# Patient Record
Sex: FEMALE | Race: BLACK OR AFRICAN AMERICAN | NOT HISPANIC OR LATINO | Employment: FULL TIME | URBAN - METROPOLITAN AREA
[De-identification: names, ages, dates, MRNs, and addresses within clinical notes are randomized per-mention and may not be internally consistent; named-entity substitution may affect disease eponyms.]

---

## 2022-08-22 ENCOUNTER — PATIENT MESSAGE (OUTPATIENT)
Dept: SURGERY | Facility: HOSPITAL | Age: 37
End: 2022-08-22
Payer: COMMERCIAL

## 2022-08-31 ENCOUNTER — HOSPITAL ENCOUNTER (OUTPATIENT)
Dept: PREADMISSION TESTING | Facility: HOSPITAL | Age: 37
Discharge: HOME | End: 2022-08-31
Attending: OBSTETRICS & GYNECOLOGY
Payer: COMMERCIAL

## 2022-08-31 VITALS — DIASTOLIC BLOOD PRESSURE: 100 MMHG | SYSTOLIC BLOOD PRESSURE: 150 MMHG

## 2022-08-31 DIAGNOSIS — Z01.818 PREOP EXAMINATION: Primary | ICD-10-CM

## 2022-08-31 DIAGNOSIS — D21.9 FIBROIDS: ICD-10-CM

## 2022-08-31 DIAGNOSIS — N92.0 MENORRHAGIA WITH REGULAR CYCLE: ICD-10-CM

## 2022-08-31 DIAGNOSIS — D64.9 ANEMIA, UNSPECIFIED TYPE: ICD-10-CM

## 2022-08-31 DIAGNOSIS — R03.0 ELEVATED BP WITHOUT DIAGNOSIS OF HYPERTENSION: ICD-10-CM

## 2022-08-31 DIAGNOSIS — E66.09 CLASS 2 OBESITY DUE TO EXCESS CALORIES WITHOUT SERIOUS COMORBIDITY WITH BODY MASS INDEX (BMI) OF 37.0 TO 37.9 IN ADULT: ICD-10-CM

## 2022-08-31 PROBLEM — E66.812 CLASS 2 OBESITY IN ADULT: Status: ACTIVE | Noted: 2022-08-31

## 2022-08-31 PROBLEM — E66.9 CLASS 2 OBESITY IN ADULT: Status: ACTIVE | Noted: 2022-08-31

## 2022-08-31 LAB
ALBUMIN SERPL BCP-MCNC: 3.6 G/DL (ref 3.5–5.2)
ALP SERPL-CCNC: 48 U/L (ref 55–135)
ALT SERPL W/O P-5'-P-CCNC: 21 U/L (ref 10–44)
ANION GAP SERPL CALC-SCNC: 6 MMOL/L (ref 8–16)
AST SERPL-CCNC: 19 U/L (ref 10–40)
BASOPHILS # BLD AUTO: 0.06 K/UL (ref 0–0.2)
BASOPHILS NFR BLD: 0.8 % (ref 0–1.9)
BILIRUB SERPL-MCNC: 0.3 MG/DL (ref 0.1–1)
BUN SERPL-MCNC: 10 MG/DL (ref 6–20)
CALCIUM SERPL-MCNC: 9.3 MG/DL (ref 8.7–10.5)
CHLORIDE SERPL-SCNC: 108 MMOL/L (ref 95–110)
CO2 SERPL-SCNC: 26 MMOL/L (ref 23–29)
CREAT SERPL-MCNC: 0.8 MG/DL (ref 0.5–1.4)
DIFFERENTIAL METHOD: ABNORMAL
EOSINOPHIL # BLD AUTO: 0.1 K/UL (ref 0–0.5)
EOSINOPHIL NFR BLD: 1.1 % (ref 0–8)
ERYTHROCYTE [DISTWIDTH] IN BLOOD BY AUTOMATED COUNT: 20.8 % (ref 11.5–14.5)
EST. GFR  (NO RACE VARIABLE): >60 ML/MIN/1.73 M^2
GLUCOSE SERPL-MCNC: 86 MG/DL (ref 70–110)
HCT VFR BLD AUTO: 28 % (ref 37–48.5)
HGB BLD-MCNC: 8.3 G/DL (ref 12–16)
IMM GRANULOCYTES # BLD AUTO: 0.02 K/UL (ref 0–0.04)
IMM GRANULOCYTES NFR BLD AUTO: 0.3 % (ref 0–0.5)
LYMPHOCYTES # BLD AUTO: 2.8 K/UL (ref 1–4.8)
LYMPHOCYTES NFR BLD: 37.2 % (ref 18–48)
MCH RBC QN AUTO: 20.5 PG (ref 27–31)
MCHC RBC AUTO-ENTMCNC: 29.6 G/DL (ref 32–36)
MCV RBC AUTO: 69 FL (ref 82–98)
MONOCYTES # BLD AUTO: 0.7 K/UL (ref 0.3–1)
MONOCYTES NFR BLD: 9.7 % (ref 4–15)
NEUTROPHILS # BLD AUTO: 3.8 K/UL (ref 1.8–7.7)
NEUTROPHILS NFR BLD: 50.9 % (ref 38–73)
NRBC BLD-RTO: 0 /100 WBC
PLATELET # BLD AUTO: 439 K/UL (ref 150–450)
PMV BLD AUTO: 9.5 FL (ref 9.2–12.9)
POTASSIUM SERPL-SCNC: 3.9 MMOL/L (ref 3.5–5.1)
PROT SERPL-MCNC: 7.5 G/DL (ref 6–8.4)
RBC # BLD AUTO: 4.04 M/UL (ref 4–5.4)
SODIUM SERPL-SCNC: 140 MMOL/L (ref 136–145)
WBC # BLD AUTO: 7.53 K/UL (ref 3.9–12.7)

## 2022-08-31 PROCEDURE — 85025 COMPLETE CBC W/AUTO DIFF WBC: CPT | Performed by: NURSE PRACTITIONER

## 2022-08-31 PROCEDURE — 80053 COMPREHEN METABOLIC PANEL: CPT | Performed by: NURSE PRACTITIONER

## 2022-08-31 NOTE — ASSESSMENT & PLAN NOTE
- [facility] who plans on performing a laparoscopic radiofrequency ablation of uterine fibroids on 9/6.    Known risk factors for perioperative complications: Anemia    Difficulty with intubation is not anticipated.    Cardiac Risk Estimation: Based on the Revised Cardiac Risk index, patient is a Class 1 risk with a 3.9% risk of a major cardiac event in a low risk procedure.    1.) Preoperative workup as follows: ECG, hemoglobin, hematocrit, electrolytes, creatinine, glucose, liver function studies.  2.) Change in medication regimen before surgery: discontinue ASA 6 days before surgery, discontinue NSAIDs 5 days before surgery.  3.) Prophylaxis for cardiac events with perioperative beta-blockers: not indicated.  4.) Invasive hemodynamic monitoring perioperatively: not indicated.  5.) Deep vein thrombosis prophylaxis postoperatively: intermittent pneumatic compression boots and regimen to be chosen by surgical team.  6.) Surveillance for postoperative MI with ECG immediately postoperatively and on postoperati ve days 1 and 2 AND troponin levels 24 hours postoperatively and on day 4 or hospital discharge (whichever comes first): not indicated.  7.) Current medications which may produce withdrawal symptoms if withheld perioperatively: None.  8.) Other measures: None.

## 2022-08-31 NOTE — ASSESSMENT & PLAN NOTE
- Patient reports heavy menses during cycle with some breakthrough bleeding.  - Reports her LMP was approximately 2 weeks ago.  - See plan as per above.

## 2022-08-31 NOTE — ASSESSMENT & PLAN NOTE
- Patient denies h/o HTN.  - Manual BP today after resting is 150/100, machine read as 184/100.  - Patient was referred to PCP for BP control and has an appointment with Dr. Adame on Thursday, 9/1 at 0940 for evaluation.  - She was instructed to check her BP at home, and notify Dr. Huertas for persistent elevation.

## 2022-08-31 NOTE — ASSESSMENT & PLAN NOTE
- Recent Hgb 7.8.  - Patient endorses heavy mensus with her last menstrual cycle approximately 2 weeks ago, associated with some PICA symptoms.  - Repeat CBC pending.  - See plan as per above.

## 2022-08-31 NOTE — H&P
Preoperative History and Physical  [facility]                                                                   Chief Complaint: Preoperative evaluation     History of Present Illness:      Mariam Downs is a 36 y.o. female with a PMHx of elevated BP without formal diagnosis of HTN (awaiting PCP evaluation), Obesity, Anemia, and Uterine fibroids who presents to the office today for a preoperative consultation at the request of Dr. Huertas who plans on performing a laparoscopic radiofrequency ablation of uterine fibroids on 9/6.    Functional Status:      The patient is able to climb a flight of stairs. The patient is able to ambulate without difficulty. The patient's functional status is affected by the surgical problem. The patient's functional status is not affected by shortness of breath, chest pain, dyspnea on exertion and fatigue.      MET score greater than 4    Past Medical History:      Past Medical History:   Diagnosis Date    Disorder of sweat glands     Encounter for blood transfusion     Fibroids         Past Surgical History:      Past Surgical History:   Procedure Laterality Date    Sweat gland removal surgery Bilateral     TUBAL LIGATION          Social History:      Social History     Socioeconomic History    Marital status:    Tobacco Use    Smoking status: Never    Smokeless tobacco: Never   Substance and Sexual Activity    Alcohol use: Not Currently    Drug use: Never    Sexual activity: Yes     Birth control/protection: None        Family History:      Family History   Problem Relation Age of Onset    Hypertension Mother     Diabetes Mother     Heart disease Father     No Known Problems Sister     No Known Problems Brother     Diabetes Maternal Grandfather     Heart disease Maternal Grandfather     Hypertension Maternal Grandmother     Lung cancer Paternal Grandfather     Diabetes Paternal Grandmother        Allergies:      Review  of patient's allergies indicates:  No Known Allergies    Medications:      Current Outpatient Medications   Medication Sig    acetaminophen (TYLENOL) 500 MG tablet Take 500 mg by mouth every 6 (six) hours as needed for Pain.    naproxen sodium (ANAPROX) 220 MG tablet Take 220 mg by mouth every 12 (twelve) hours.     No current facility-administered medications for this encounter.       Vitals:      Vitals:    08/31/22 1438   BP: (!) 150/100       Review of Systems:        Constitutional: Negative for fever, chills, weight loss, malaise/fatigue and diaphoresis.   HENT: Negative for hearing loss, ear pain, nosebleeds, congestion, sore throat, neck pain, tinnitus and ear discharge.    Eyes: Negative for blurred vision, double vision, photophobia, pain, discharge and redness.   Respiratory: Negative for cough, hemoptysis, sputum production, shortness of breath, wheezing and stridor.    Cardiovascular: Negative for chest pain, palpitations, orthopnea, claudication, leg swelling and PND.   Gastrointestinal: Negative for heartburn, nausea, vomiting, abdominal pain, diarrhea, constipation, blood in stool and melena.   Genitourinary: Negative for dysuria, urgency, frequency, hematuria and flank pain. Positive for frequent lower abdominal pain that worsens with her menstrual cycle.   Musculoskeletal: Negative for myalgias, back pain, joint pain and falls.   Skin: Negative for itching and rash.   Neurological: Negative for dizziness, tingling, tremors, sensory change, speech change, focal weakness, seizures, loss of consciousness, weakness and headaches.   Endo/Heme/Allergies: Negative for environmental allergies and polydipsia. Does not bruise/bleed easily.   Psychiatric/Behavioral: Negative for depression, suicidal ideas, hallucinations, memory loss and substance abuse. The patient is not nervous/anxious and does not have insomnia.    All 14 systems reviewed and negative except as noted above.    Physical Exam:   "    Constitutional: Appears well-developed, well-nourished and in no acute distress.  Patient is oriented to person, place, and time.   Head: Normocephalic and atraumatic. Mucous membranes moist.  Neck: Neck supple no mass.   Cardiovascular: Normal rate and regular rhythm.  S1 S2 appreciated by ascultation.  Pulmonary/Chest: Effort normal and clear to auscultation bilaterally. No respiratory distress.   Abdomen: Soft. Non-tender and non-distended. Bowel sounds are normal.   Neurological: Patient is alert and oriented to person, place and time. Moves all extremities.  Skin: Warm and dry. No lesions.  Extremities: No clubbing, cyanosis or edema.    Laboratory data:      Reviewed and noted in plan where applicable. Please see chart for full laboratory data.    No results for input(s): CPK, CPKMB, TROPONINI, MB in the last 24 hours. No results for input(s): POCTGLUCOSE in the last 24 hours.     No results found for: INR, PROTIME    Lab Results   Component Value Date    WBC 8.0 07/02/2022    HGB 7.8 (L) 07/02/2022    HCT 27.7 (L) 07/02/2022    MCV 69 (L) 07/02/2022     (H) 07/02/2022       No results for input(s): GLU, NA, K, CL, CO2, BUN, CREATININE, CALCIUM, MG in the last 24 hours.    Predictors of intubation difficulty:       Morbid obesity? yes    Anatomically abnormal facies? no   Prominent incisors? no   Receding mandible? no   Short, thick neck? no   Neck range of motion: normal   Dentition: Chipped teeth present ("chipped root canals to upper, and left lower)  Based on the Modified Mallampati, patient is a mallampati score: I (soft palate, uvula, fauces, and tonsillar pillars visible)    Cardiographics:      ECG:  Not indicated.    Echocardiogram:  Not indicated.    Imaging:      Chest x-ray:  Not indicated.       Assessment and Plan:      Symptomatic Fibroids  - [facility] who plans on performing a laparoscopic radiofrequency ablation of uterine fibroids on " 9/6.    Known risk factors for perioperative complications: Anemia    Difficulty with intubation is not anticipated.    Cardiac Risk Estimation: Based on the Revised Cardiac Risk index, patient is a Class 1 risk with a 3.9% risk of a major cardiac event in a low risk procedure.    1.) Preoperative workup as follows: ECG, hemoglobin, hematocrit, electrolytes, creatinine, glucose, liver function studies.  2.) Change in medication regimen before surgery: discontinue ASA 6 days before surgery, discontinue NSAIDs 5 days before surgery.  3.) Prophylaxis for cardiac events with perioperative beta-blockers: not indicated.  4.) Invasive hemodynamic monitoring perioperatively: not indicated.  5.) Deep vein thrombosis prophylaxis postoperatively: intermittent pneumatic compression boots and regimen to be chosen by surgical team.  6.) Surveillance for postoperative MI with ECG immediately postoperatively and on postoperati ve days 1 and 2 AND troponin levels 24 hours postoperatively and on day 4 or hospital discharge (whichever comes first): not indicated.  7.) Current medications which may produce withdrawal symptoms if withheld perioperatively: None.  8.) Other measures: None.    Menorrhagia  - Patient reports heavy menses during cycle with some breakthrough bleeding.  - Reports her LMP was approximately 2 weeks ago.  - See plan as per above.    Anemia  - Recent Hgb 7.8.  - Patient endorses heavy mensus with her last menstrual cycle approximately 2 weeks ago, associated with some PICA symptoms.  - Repeat CBC pending.  - See plan as per above.      Elevated BP without diagnosis of hypertension  - Patient denies h/o HTN.  - Manual BP today after resting is 150/100, machine read as 184/100.  - Patient was referred to PCP for BP control and has an appointment with Dr. Adame on Thursday, 9/1 at 0940 for evaluation.  - She was instructed to check her BP at home, and notify Dr. Huertas for persistent elevation.    Class 2 obesity  in adult  - Self directed diet and weight loss.          Electronically signed by Rupal Madrigal DNP, ACNP on 8/31/2022 at 2:31 PM.

## 2022-08-31 NOTE — DISCHARGE INSTRUCTIONS
To confirm, your doctor has instructed you that surgery is scheduled for 9/6/22.     Pre admit office will call the afternoon prior to surgery between 1PM and 3PM with final arrival time.     IMPORTANT INSTRUCTIONS!   Do not eat, drink, or smoke after 12 midnight, including water.  OK to brush teeth, but no gum, candy, or mints!     Take only these medicines with a small swallow of water-morning of surgery.   None         Pre-operative instructions:     Please review the Pre-Operative Instruction booklet that you were given.     Bathing Instructions--See page 6 in the Pre-operative booklet.     Prevention of surgical site infections:     -Keep incisions clean and dry.    -Do not soak/submerge incisions in water until completely healed.    -Do not apply lotions, powders, creams, or deodorants to site.    -Always make sure hands are cleaned with antibacterial soap/ alcohol-based  prior to touching the surgical site. (This includes doctors, nurses, staff, and yourself.)      Signs and symptoms:    -Redness and pain around the area where you had surgery    -Drainage of cloudy fluid from your surgical wound    -Fever over 100.4     Additional comments or instructions:   Received a copy of Pre-operative instructions booklet, FAQ surgical site infection sheet, and packets of hibiclens (if indicated).

## 2022-09-01 ENCOUNTER — OFFICE VISIT (OUTPATIENT)
Dept: FAMILY MEDICINE | Facility: CLINIC | Age: 37
End: 2022-09-01
Payer: COMMERCIAL

## 2022-09-01 VITALS
SYSTOLIC BLOOD PRESSURE: 144 MMHG | WEIGHT: 227.38 LBS | BODY MASS INDEX: 37.88 KG/M2 | RESPIRATION RATE: 18 BRPM | HEIGHT: 65 IN | DIASTOLIC BLOOD PRESSURE: 82 MMHG | OXYGEN SATURATION: 98 % | HEART RATE: 98 BPM

## 2022-09-01 DIAGNOSIS — Z01.818 PRE-OP EXAM: Primary | ICD-10-CM

## 2022-09-01 DIAGNOSIS — D64.9 ANEMIA, UNSPECIFIED TYPE: ICD-10-CM

## 2022-09-01 DIAGNOSIS — I10 ESSENTIAL HYPERTENSION: ICD-10-CM

## 2022-09-01 DIAGNOSIS — D21.9 FIBROID: ICD-10-CM

## 2022-09-01 PROCEDURE — 3008F BODY MASS INDEX DOCD: CPT | Mod: CPTII,S$GLB,, | Performed by: FAMILY MEDICINE

## 2022-09-01 PROCEDURE — 3077F PR MOST RECENT SYSTOLIC BLOOD PRESSURE >= 140 MM HG: ICD-10-PCS | Mod: CPTII,S$GLB,, | Performed by: FAMILY MEDICINE

## 2022-09-01 PROCEDURE — 1159F MED LIST DOCD IN RCRD: CPT | Mod: CPTII,S$GLB,, | Performed by: FAMILY MEDICINE

## 2022-09-01 PROCEDURE — 99204 OFFICE O/P NEW MOD 45 MIN: CPT | Mod: S$GLB,,, | Performed by: FAMILY MEDICINE

## 2022-09-01 PROCEDURE — 3077F SYST BP >= 140 MM HG: CPT | Mod: CPTII,S$GLB,, | Performed by: FAMILY MEDICINE

## 2022-09-01 PROCEDURE — 99204 PR OFFICE/OUTPT VISIT, NEW, LEVL IV, 45-59 MIN: ICD-10-PCS | Mod: S$GLB,,, | Performed by: FAMILY MEDICINE

## 2022-09-01 PROCEDURE — 1159F PR MEDICATION LIST DOCUMENTED IN MEDICAL RECORD: ICD-10-PCS | Mod: CPTII,S$GLB,, | Performed by: FAMILY MEDICINE

## 2022-09-01 PROCEDURE — 99999 PR PBB SHADOW E&M-EST. PATIENT-LVL III: ICD-10-PCS | Mod: PBBFAC,,, | Performed by: FAMILY MEDICINE

## 2022-09-01 PROCEDURE — 4010F ACE/ARB THERAPY RXD/TAKEN: CPT | Mod: CPTII,S$GLB,, | Performed by: FAMILY MEDICINE

## 2022-09-01 PROCEDURE — 99999 PR PBB SHADOW E&M-EST. PATIENT-LVL III: CPT | Mod: PBBFAC,,, | Performed by: FAMILY MEDICINE

## 2022-09-01 PROCEDURE — 3079F DIAST BP 80-89 MM HG: CPT | Mod: CPTII,S$GLB,, | Performed by: FAMILY MEDICINE

## 2022-09-01 PROCEDURE — 3079F PR MOST RECENT DIASTOLIC BLOOD PRESSURE 80-89 MM HG: ICD-10-PCS | Mod: CPTII,S$GLB,, | Performed by: FAMILY MEDICINE

## 2022-09-01 PROCEDURE — 4010F PR ACE/ARB THEARPY RXD/TAKEN: ICD-10-PCS | Mod: CPTII,S$GLB,, | Performed by: FAMILY MEDICINE

## 2022-09-01 PROCEDURE — 3008F PR BODY MASS INDEX (BMI) DOCUMENTED: ICD-10-PCS | Mod: CPTII,S$GLB,, | Performed by: FAMILY MEDICINE

## 2022-09-01 RX ORDER — OLMESARTAN MEDOXOMIL 20 MG/1
20 TABLET ORAL DAILY
Qty: 30 TABLET | Refills: 11 | Status: SHIPPED | OUTPATIENT
Start: 2022-09-01 | End: 2023-09-01

## 2022-09-01 NOTE — PROGRESS NOTES
Subjective:      Patient ID: Mariam Downs is a 36 y.o. female.    Chief Complaint: Establish Care    HPI    Patient with pmhx of anemia and fibroids here today to establish care and for HTN management. BP elevated at recent OBGYN office visit and needs it controlled prior to surgery     Fibroid ablation scheduled on September 6th - patient having severe pain during cycles, heavy cycles for the last few years. Now anemia last a1c 8.3 (up fro 7.8)  LMP 2 weeks ago -OBGYN - Marycarmen Clement MD ([facility]).     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[facility]    Past Medical History:   Diagnosis Date    Disorder of sweat glands     Encounter for blood transfusion     Fibroids        Past Surgical History:   Procedure Laterality Date    Sweat gland removal surgery Bilateral     TUBAL LIGATION         Family History   Problem Relation Age of Onset    Hypertension Mother     Diabetes Mother     Heart disease Father     No Known Problems Sister     No Known Problems Brother     Diabetes Maternal Grandfather     Heart disease Maternal Grandfather     Hypertension Maternal Grandmother     Lung cancer Paternal Grandfather     Diabetes Paternal Grandmother        Social History     Socioeconomic History    Marital status:    Tobacco Use    Smoking status: Never    Smokeless tobacco: Never   Substance and Sexual Activity    Alcohol use: Not Currently    Drug use: Never    Sexual activity: Yes     Birth control/protection: None       Health Maintenance Topics with due status: Not Due       Topic Last Completion Date    Cervical Cancer Screening 06/06/2022       Medication List with Changes/Refills   New Medications    OLMESARTAN (BENICAR) 20 MG TABLET    Take 1 tablet (20 mg total) by mouth once daily.   Current Medications    ACETAMINOPHEN (TYLENOL) 500 MG TABLET    Take 500 mg by mouth every 6 (six) hours as needed for Pain.    NAPROXEN SODIUM (ANAPROX) 220 MG TABLET    Take 220 mg by mouth every 12 (twelve) hours.       Review of patient's  allergies indicates:  No Known Allergies    Review of Systems   Constitutional:  Negative for fever.   HENT:  Negative for congestion.    Eyes:  Negative for blurred vision.   Respiratory:  Negative for shortness of breath.    Cardiovascular:  Negative for chest pain and leg swelling.   Gastrointestinal:  Negative for abdominal pain, constipation and diarrhea.   Genitourinary:  Negative for dysuria.   Skin:  Negative for rash.   Neurological:  Negative for headaches.     Objective:     Vitals:    09/01/22 1000   BP: (!) 144/82   Pulse: 98   Resp: 18     Body mass index is 37.84 kg/m².    Physical Exam  Vitals and nursing note reviewed.   Constitutional:       General: She is not in acute distress.     Appearance: She is well-developed.   HENT:      Head: Normocephalic and atraumatic.      Right Ear: External ear normal.      Left Ear: External ear normal.      Nose: Nose normal.   Eyes:      Conjunctiva/sclera: Conjunctivae normal.      Pupils: Pupils are equal, round, and reactive to light.   Neck:      Thyroid: No thyromegaly.   Cardiovascular:      Rate and Rhythm: Normal rate and regular rhythm.      Heart sounds: Normal heart sounds. No murmur heard.  Pulmonary:      Effort: Pulmonary effort is normal. No respiratory distress.      Breath sounds: Normal breath sounds. No wheezing or rales.   Chest:      Chest wall: No tenderness.   Abdominal:      General: Bowel sounds are normal. There is no distension.      Palpations: Abdomen is soft.      Tenderness: There is no abdominal tenderness.   Lymphadenopathy:      Cervical: No cervical adenopathy.   Skin:     General: Skin is warm and dry.   Neurological:      Mental Status: She is alert and oriented to person, place, and time.     Assessment and Plan:     Pre-op exam    Essential hypertension    Anemia, unspecified type    Fibroid    Other orders  -     olmesartan (BENICAR) 20 MG tablet; Take 1 tablet (20 mg total) by mouth once daily.  Dispense: 30 tablet;  Refill: 11    Will start benicar - dash diet, exercise, weight loss advised to improve this. Given that her surgery is in a few day and we are closed Monday for labor day - I will not be able to see her again/recheck pressure before surgery. I did message Dr. Clement updating her on our plan through epic.   I advised iron supplementation (2-3 times daily) + vitamin C and increase in iron in diet. Anemia due to heavy/painful menstrual cycles. Planned surgery - following obgyn.     Follow up in about 1 month (around 10/1/2022) for HTN/wellness visit .

## 2022-09-02 ENCOUNTER — ANESTHESIA EVENT (OUTPATIENT)
Dept: SURGERY | Facility: HOSPITAL | Age: 37
End: 2022-09-02
Payer: COMMERCIAL

## 2022-09-02 NOTE — H&P (VIEW-ONLY)
Chief Complaint:  Pre-op Exam        History of Present Illness    Mariam Sierrary is a 36 y.o.  here for preop visit. Patient is scheduled for a Sonata procedure on 2022. Consent's signed in office today.     OB History    Para Term  AB Living   4 4 4     4   SAB IAB Ectopic Multiple Live Births                  # Outcome Date GA Lbr Hakeem/2nd Weight Sex Delivery Anes PTL Lv   4 Term            3 Term            2 Term            1 Term                Patient's last menstrual period was 2022 (approximate).       Review of Systems  Review of Systems     Objective:   Physical Exam     Last Pap: unknown      Assessment:     1. Fibroids        2. Menometrorrhagia          Mariam was seen today for pre-op exam.    Diagnoses and all orders for this visit:    Fibroids    Menometrorrhagia            Plan:     All risks, benefits, and alternatives to the procedure were discussed.  She agrees with the plan of care; therefore, we will proceed with the surgery as scheduled.     Follow up with post op appointment.

## 2022-09-02 NOTE — ANESTHESIA PREPROCEDURE EVALUATION
09/02/2022  Mariam Downs is a 36 y.o., female.      Pre-op Assessment    I have reviewed the Patient Summary Reports.     I have reviewed the Nursing Notes.       Review of Systems  Anesthesia Hx:  No problems with previous Anesthesia  Denies Family Hx of Anesthesia complications.   Denies Personal Hx of Anesthesia complications.   Social:  Non-Smoker, No Alcohol Use    Hematology/Oncology:         -- Anemia:   Cardiovascular:   Exercise tolerance: good Hypertension Denies MI.   Denies Dysrhythmias.  Newly diagnosed HTN and started on medications 4-5 days ago; pt took her AM dose today   Pulmonary:  Pulmonary Normal    Renal/:  Renal/ Normal     Hepatic/GI:  Hepatic/GI Normal    OB/GYN/PEDS:  Uterine fibroids   Neurological:  Neurology Normal    Endocrine:  Endocrine Normal        Physical Exam  General: Well nourished, Cooperative, Oriented and Alert    Airway:  Mallampati: II   Mouth Opening: Normal  TM Distance: 4 - 6 cm  Tongue: Normal  Neck ROM: Normal ROM    Dental:    Chest/Lungs:  Clear to auscultation, Normal Respiratory Rate    Heart:  Rate: Normal  Rhythm: Regular Rhythm      Wt Readings from Last 3 Encounters:   09/02/22 102.5 kg (226 lb)   09/01/22 103.1 kg (227 lb 6.5 oz)   08/15/22 102.5 kg (226 lb)     Temp Readings from Last 3 Encounters:   No data found for Temp     BP Readings from Last 3 Encounters:   09/02/22 (!) 152/109   09/01/22 (!) 144/82   08/31/22 (!) 150/100     Pulse Readings from Last 3 Encounters:   09/01/22 98     Lab Results   Component Value Date    WBC 7.53 08/31/2022    HGB 8.3 (L) 08/31/2022    HCT 28.0 (L) 08/31/2022    MCV 69 (L) 08/31/2022     08/31/2022       CMP  Sodium   Date Value Ref Range Status   08/31/2022 140 136 - 145 mmol/L Final     Potassium   Date Value Ref Range Status   08/31/2022 3.9 3.5 - 5.1 mmol/L Final     Chloride   Date Value Ref  Range Status   08/31/2022 108 95 - 110 mmol/L Final     CO2   Date Value Ref Range Status   08/31/2022 26 23 - 29 mmol/L Final     Glucose   Date Value Ref Range Status   08/31/2022 86 70 - 110 mg/dL Final     BUN   Date Value Ref Range Status   08/31/2022 10 6 - 20 mg/dL Final     Creatinine   Date Value Ref Range Status   08/31/2022 0.8 0.5 - 1.4 mg/dL Final     Calcium   Date Value Ref Range Status   08/31/2022 9.3 8.7 - 10.5 mg/dL Final     Total Protein   Date Value Ref Range Status   08/31/2022 7.5 6.0 - 8.4 g/dL Final     Albumin   Date Value Ref Range Status   08/31/2022 3.6 3.5 - 5.2 g/dL Final     Total Bilirubin   Date Value Ref Range Status   08/31/2022 0.3 0.1 - 1.0 mg/dL Final     Comment:     For infants and newborns, interpretation of results should be based  on gestational age, weight and in agreement with clinical  observations.    Premature Infant recommended reference ranges:  Up to 24 hours.............<8.0 mg/dL  Up to 48 hours............<12.0 mg/dL  3-5 days..................<15.0 mg/dL  6-29 days.................<15.0 mg/dL       Alkaline Phosphatase   Date Value Ref Range Status   08/31/2022 48 (L) 55 - 135 U/L Final     AST   Date Value Ref Range Status   08/31/2022 19 10 - 40 U/L Final     ALT   Date Value Ref Range Status   08/31/2022 21 10 - 44 U/L Final     Anion Gap   Date Value Ref Range Status   08/31/2022 6 (L) 8 - 16 mmol/L Final     9/6/2022 UPT negative  9/6/2022 COVID negative    Anesthesia Plan  Type of Anesthesia, risks & benefits discussed:    Anesthesia Type: Gen ETT, Gen Supraglottic Airway  Intra-op Monitoring Plan: Standard ASA Monitors  Post Op Pain Control Plan: multimodal analgesia and IV/PO Opioids PRN  Induction:  IV  Informed Consent: Informed consent signed with the Patient and all parties understand the risks and agree with anesthesia plan.  All questions answered.   ASA Score: 2  Day of Surgery Review of History & Physical: H&P Update referred to the  surgeon/provider.    Ready For Surgery From Anesthesia Perspective.     .

## 2022-09-06 ENCOUNTER — HOSPITAL ENCOUNTER (OUTPATIENT)
Facility: HOSPITAL | Age: 37
Discharge: HOME | End: 2022-09-06
Attending: OBSTETRICS & GYNECOLOGY | Admitting: OBSTETRICS & GYNECOLOGY
Payer: COMMERCIAL

## 2022-09-06 ENCOUNTER — NURSE TRIAGE (OUTPATIENT)
Dept: ADMINISTRATIVE | Facility: CLINIC | Age: 37
End: 2022-09-06
Payer: COMMERCIAL

## 2022-09-06 ENCOUNTER — PATIENT MESSAGE (OUTPATIENT)
Dept: SURGERY | Facility: HOSPITAL | Age: 37
End: 2022-09-06

## 2022-09-06 ENCOUNTER — ANESTHESIA (OUTPATIENT)
Dept: SURGERY | Facility: HOSPITAL | Age: 37
End: 2022-09-06
Payer: COMMERCIAL

## 2022-09-06 VITALS
DIASTOLIC BLOOD PRESSURE: 84 MMHG | RESPIRATION RATE: 18 BRPM | SYSTOLIC BLOOD PRESSURE: 157 MMHG | OXYGEN SATURATION: 98 % | TEMPERATURE: 98 F | HEIGHT: 65 IN | WEIGHT: 228.06 LBS | HEART RATE: 68 BPM | BODY MASS INDEX: 38 KG/M2

## 2022-09-06 DIAGNOSIS — D21.9 FIBROIDS: ICD-10-CM

## 2022-09-06 LAB
B-HCG UR QL: NEGATIVE
CTP QC/QA: YES
SARS-COV-2 RNA NPH QL NAA+NON-PROBE: NOT DETECTED

## 2022-09-06 PROCEDURE — 63600175 PHARM REV CODE 636 W HCPCS: Performed by: ANESTHESIOLOGY

## 2022-09-06 PROCEDURE — D9220A PRA ANESTHESIA: ICD-10-PCS | Mod: ANES,,, | Performed by: ANESTHESIOLOGY

## 2022-09-06 PROCEDURE — 27201423 OPTIME MED/SURG SUP & DEVICES STERILE SUPPLY: Performed by: OBSTETRICS & GYNECOLOGY

## 2022-09-06 PROCEDURE — 36000706: Performed by: OBSTETRICS & GYNECOLOGY

## 2022-09-06 PROCEDURE — 25000003 PHARM REV CODE 250: Performed by: NURSE ANESTHETIST, CERTIFIED REGISTERED

## 2022-09-06 PROCEDURE — D9220A PRA ANESTHESIA: ICD-10-PCS | Mod: CRNA,,, | Performed by: NURSE ANESTHETIST, CERTIFIED REGISTERED

## 2022-09-06 PROCEDURE — 71000033 HC RECOVERY, INTIAL HOUR: Performed by: OBSTETRICS & GYNECOLOGY

## 2022-09-06 PROCEDURE — D9220A PRA ANESTHESIA: Mod: CRNA,,, | Performed by: NURSE ANESTHETIST, CERTIFIED REGISTERED

## 2022-09-06 PROCEDURE — 37000008 HC ANESTHESIA 1ST 15 MINUTES: Performed by: OBSTETRICS & GYNECOLOGY

## 2022-09-06 PROCEDURE — 71000039 HC RECOVERY, EACH ADD'L HOUR: Performed by: OBSTETRICS & GYNECOLOGY

## 2022-09-06 PROCEDURE — 81025 URINE PREGNANCY TEST: CPT | Performed by: OBSTETRICS & GYNECOLOGY

## 2022-09-06 PROCEDURE — 63600175 PHARM REV CODE 636 W HCPCS: Performed by: OBSTETRICS & GYNECOLOGY

## 2022-09-06 PROCEDURE — D9220A PRA ANESTHESIA: Mod: ANES,,, | Performed by: ANESTHESIOLOGY

## 2022-09-06 PROCEDURE — 37000009 HC ANESTHESIA EA ADD 15 MINS: Performed by: OBSTETRICS & GYNECOLOGY

## 2022-09-06 PROCEDURE — 63600175 PHARM REV CODE 636 W HCPCS: Performed by: NURSE ANESTHETIST, CERTIFIED REGISTERED

## 2022-09-06 PROCEDURE — 36000707: Performed by: OBSTETRICS & GYNECOLOGY

## 2022-09-06 PROCEDURE — 71000015 HC POSTOP RECOV 1ST HR: Performed by: OBSTETRICS & GYNECOLOGY

## 2022-09-06 RX ORDER — ACETAMINOPHEN 10 MG/ML
INJECTION, SOLUTION INTRAVENOUS
Status: DISCONTINUED | OUTPATIENT
Start: 2022-09-06 | End: 2022-09-06

## 2022-09-06 RX ORDER — DEXAMETHASONE SODIUM PHOSPHATE 4 MG/ML
INJECTION, SOLUTION INTRA-ARTICULAR; INTRALESIONAL; INTRAMUSCULAR; INTRAVENOUS; SOFT TISSUE
Status: DISCONTINUED | OUTPATIENT
Start: 2022-09-06 | End: 2022-09-06

## 2022-09-06 RX ORDER — SUCCINYLCHOLINE CHLORIDE 20 MG/ML
INJECTION INTRAMUSCULAR; INTRAVENOUS
Status: DISCONTINUED | OUTPATIENT
Start: 2022-09-06 | End: 2022-09-06

## 2022-09-06 RX ORDER — SODIUM CHLORIDE 0.9 % (FLUSH) 0.9 %
10 SYRINGE (ML) INJECTION
Status: DISCONTINUED | OUTPATIENT
Start: 2022-09-06 | End: 2022-09-06 | Stop reason: HOSPADM

## 2022-09-06 RX ORDER — ROCURONIUM BROMIDE 10 MG/ML
INJECTION, SOLUTION INTRAVENOUS
Status: DISCONTINUED | OUTPATIENT
Start: 2022-09-06 | End: 2022-09-06

## 2022-09-06 RX ORDER — PROPOFOL 10 MG/ML
VIAL (ML) INTRAVENOUS
Status: DISCONTINUED | OUTPATIENT
Start: 2022-09-06 | End: 2022-09-06

## 2022-09-06 RX ORDER — ONDANSETRON 2 MG/ML
INJECTION INTRAMUSCULAR; INTRAVENOUS
Status: DISCONTINUED | OUTPATIENT
Start: 2022-09-06 | End: 2022-09-06

## 2022-09-06 RX ORDER — NEOSTIGMINE METHYLSULFATE 1 MG/ML
INJECTION, SOLUTION INTRAVENOUS
Status: DISCONTINUED | OUTPATIENT
Start: 2022-09-06 | End: 2022-09-06

## 2022-09-06 RX ORDER — MIDAZOLAM HYDROCHLORIDE 1 MG/ML
INJECTION, SOLUTION INTRAMUSCULAR; INTRAVENOUS
Status: DISCONTINUED | OUTPATIENT
Start: 2022-09-06 | End: 2022-09-06

## 2022-09-06 RX ORDER — LIDOCAINE HYDROCHLORIDE 20 MG/ML
INJECTION INTRAVENOUS
Status: DISCONTINUED | OUTPATIENT
Start: 2022-09-06 | End: 2022-09-06

## 2022-09-06 RX ORDER — SODIUM CHLORIDE, SODIUM LACTATE, POTASSIUM CHLORIDE, CALCIUM CHLORIDE 600; 310; 30; 20 MG/100ML; MG/100ML; MG/100ML; MG/100ML
INJECTION, SOLUTION INTRAVENOUS CONTINUOUS
Status: DISCONTINUED | OUTPATIENT
Start: 2022-09-06 | End: 2022-09-06 | Stop reason: HOSPADM

## 2022-09-06 RX ORDER — FENTANYL CITRATE 50 UG/ML
INJECTION, SOLUTION INTRAMUSCULAR; INTRAVENOUS
Status: DISCONTINUED | OUTPATIENT
Start: 2022-09-06 | End: 2022-09-06

## 2022-09-06 RX ORDER — ONDANSETRON 2 MG/ML
4 INJECTION INTRAMUSCULAR; INTRAVENOUS DAILY PRN
Status: DISCONTINUED | OUTPATIENT
Start: 2022-09-06 | End: 2022-09-06 | Stop reason: HOSPADM

## 2022-09-06 RX ORDER — DEXMEDETOMIDINE HYDROCHLORIDE 100 UG/ML
INJECTION, SOLUTION INTRAVENOUS
Status: DISCONTINUED | OUTPATIENT
Start: 2022-09-06 | End: 2022-09-06

## 2022-09-06 RX ORDER — KETOROLAC TROMETHAMINE 30 MG/ML
INJECTION, SOLUTION INTRAMUSCULAR; INTRAVENOUS
Status: DISCONTINUED | OUTPATIENT
Start: 2022-09-06 | End: 2022-09-06

## 2022-09-06 RX ORDER — HYDROMORPHONE HYDROCHLORIDE 2 MG/ML
0.2 INJECTION, SOLUTION INTRAMUSCULAR; INTRAVENOUS; SUBCUTANEOUS EVERY 5 MIN PRN
Status: DISCONTINUED | OUTPATIENT
Start: 2022-09-06 | End: 2022-09-06 | Stop reason: HOSPADM

## 2022-09-06 RX ADMIN — Medication 100 MG: at 11:09

## 2022-09-06 RX ADMIN — SODIUM CHLORIDE, SODIUM LACTATE, POTASSIUM CHLORIDE, AND CALCIUM CHLORIDE: 600; 310; 30; 20 INJECTION, SOLUTION INTRAVENOUS at 11:09

## 2022-09-06 RX ADMIN — FENTANYL CITRATE 100 MCG: 50 INJECTION, SOLUTION INTRAMUSCULAR; INTRAVENOUS at 11:09

## 2022-09-06 RX ADMIN — ROCURONIUM BROMIDE 35 MG: 10 INJECTION, SOLUTION INTRAVENOUS at 11:09

## 2022-09-06 RX ADMIN — DEXAMETHASONE SODIUM PHOSPHATE 8 MG: 4 INJECTION, SOLUTION INTRA-ARTICULAR; INTRALESIONAL; INTRAMUSCULAR; INTRAVENOUS; SOFT TISSUE at 11:09

## 2022-09-06 RX ADMIN — ONDANSETRON 4 MG: 2 INJECTION, SOLUTION INTRAMUSCULAR; INTRAVENOUS at 11:09

## 2022-09-06 RX ADMIN — ACETAMINOPHEN 1000 MG: 10 INJECTION, SOLUTION INTRAVENOUS at 11:09

## 2022-09-06 RX ADMIN — MIDAZOLAM 2 MG: 1 INJECTION INTRAMUSCULAR; INTRAVENOUS at 11:09

## 2022-09-06 RX ADMIN — GLYCOPYRROLATE 0.6 MG: 0.2 INJECTION, SOLUTION INTRAMUSCULAR; INTRAVITREAL at 11:09

## 2022-09-06 RX ADMIN — DEXMEDETOMIDINE HYDROCHLORIDE 4 MCG: 100 INJECTION, SOLUTION INTRAVENOUS at 11:09

## 2022-09-06 RX ADMIN — HYDROMORPHONE HYDROCHLORIDE 0.2 MG: 2 INJECTION INTRAMUSCULAR; INTRAVENOUS; SUBCUTANEOUS at 12:09

## 2022-09-06 RX ADMIN — PROPOFOL 200 MG: 10 INJECTION, EMULSION INTRAVENOUS at 11:09

## 2022-09-06 RX ADMIN — NEOSTIGMINE METHYLSULFATE 4 MG: 1 INJECTION INTRAVENOUS at 11:09

## 2022-09-06 RX ADMIN — ROCURONIUM BROMIDE 5 MG: 10 INJECTION, SOLUTION INTRAVENOUS at 11:09

## 2022-09-06 RX ADMIN — SUCCINYLCHOLINE CHLORIDE 120 MG: 20 INJECTION, SOLUTION INTRAMUSCULAR; INTRAVENOUS at 11:09

## 2022-09-06 RX ADMIN — HYDROMORPHONE HYDROCHLORIDE 0.2 MG: 2 INJECTION INTRAMUSCULAR; INTRAVENOUS; SUBCUTANEOUS at 01:09

## 2022-09-06 RX ADMIN — DEXMEDETOMIDINE HYDROCHLORIDE 8 MCG: 100 INJECTION, SOLUTION INTRAVENOUS at 11:09

## 2022-09-06 RX ADMIN — KETOROLAC TROMETHAMINE 30 MG: 30 INJECTION, SOLUTION INTRAMUSCULAR at 11:09

## 2022-09-06 NOTE — TRANSFER OF CARE
"Anesthesia Transfer of Care Note    Patient: Mariam Downs    Procedure(s) Performed: Procedure(s) (LRB):  TRANSCERVICAL RADIOFREQUENCY ABLATION (RFA) OF LEIOMYOMA OF UTERUS WITH ULTRASOUND GUIDANCE (N/A)  HYSTEROSCOPY DIAGNOSTIC (N/A)    Patient location: PACU    Anesthesia Type: general    Transport from OR: Transported from OR on room air with adequate spontaneous ventilation    Post pain: adequate analgesia    Post assessment: no apparent anesthetic complications and tolerated procedure well    Post vital signs: stable    Level of consciousness: awake    Nausea/Vomiting: no nausea/vomiting    Complications: none    Transfer of care protocol was followed      Last vitals:   Visit Vitals  /75 (BP Location: Right arm, Patient Position: Lying)   Pulse 83   Temp 36.6 °C (97.9 °F) (Temporal)   Resp 16   Ht 5' 5" (1.651 m)   Wt 103.4 kg (228 lb 1.1 oz)   LMP 08/19/2022 (Approximate)   SpO2 96%   Breastfeeding No   BMI 37.95 kg/m²     "

## 2022-09-06 NOTE — PLAN OF CARE
Reviewed and completed all discharge orders. Printed AVS and educated patient and family member of its entirety, including physician's orders, follow-up appt, medications, when to call, and when to report to the emergency room. I encouraged questions, answered them thoroughly, and evaluated my instructions via teach-back method. Patient has met all hospital discharge criteria at this point. Patient wheeled to car by RN.

## 2022-09-06 NOTE — BRIEF OP NOTE
PREOPERATIVE DIAGNOSIS:   1. Symptomatic uterine fibroids  2.  Desires to retain her uterus.     Surgeon: Bradley Huertas M.D.    Assistant: none    Anesthesia: General    Complications: None.    PROCEDURE:  Hysteroscopy, Sonata    Date of Procedure: 22      Mariam Downs is a  who presents with uterine fibroids. After the indications, risks, benefits, and alternatives were explained to the patient, her questions were answered and informed consent was obtained and signed.      PROCEDURE:  After adequate induction of anesthesia, the patient was identified and a time out was performed and procedure was confirmed. The patient was placed in dorsal lithotomy position and an exam under anesthesia revealed a mid-position, mobile fibroid uterus and normal adnexa. A speculum was placed, cervix was grasped with a tenaculum and sounded easily to a depth of 8 cm. The cervix was then easily dilated to a #27 Burton dilator. The operative hysteroscope was inserted, and the uterine cavity was visualized in its entirety. Both tubal ostia were noted to be normal.     The uterine cavity was notable for: normal endometrial cavity without lesions    The sonata handpiece was then inserted and a complete uterine survey was performed with the ultrasound. Sonata fibroid ablation was then performed:   5x4cm at the 2o'clock position        All cycles were carried out under direct ultrasound intrauterine guidance and visualization with the ablation guide noted to be within the serosa at all times. The fibroids treated appeared ablated with US guidance with outgassing noted by US appearance.     Endometrium was spared.     Sponge needle instrument count was correct x 2. All instruments were removed from the vagina. Hemostasis was assured. The patient was then awakened and taken to the recovery room in stable condition, tolerating the procedure well.     ESTIMATED BLOOD LOSS: 10 cc.     SPECIMENS OBTAINED: None

## 2022-09-06 NOTE — PLAN OF CARE
Patient prepped for surgery.  at bedside. Blood pressure recheck 196/103. Pt states she just started taking blood pressure medicine on Friday. Reported to Dr. Allison. Will continue to monitor patient.

## 2022-09-06 NOTE — LETTER
September 6, 2022         [address] [facility] [place] [address]  Phone: [phone]  Fax: [phone]       Patient: Mariam Downs   YOB: 1985  Date of Visit: 09/06/2022    To Whom It May Concern:    González Downs  was at [facility] on 09/06/2022 for surgery with Dr. Huertas. The patient may return to work on 9/20/2022 with no restrictions. If you have any questions or concerns, or if I can be of further assistance, please do not hesitate to contact me.    Sincerely,    LINDA De Santiago MD

## 2022-09-06 NOTE — ANESTHESIA POSTPROCEDURE EVALUATION
Anesthesia Post Evaluation    Patient: Mariam Downs    Procedure(s) Performed: Procedure(s) (LRB):  TRANSCERVICAL RADIOFREQUENCY ABLATION (RFA) OF LEIOMYOMA OF UTERUS WITH ULTRASOUND GUIDANCE (N/A)  HYSTEROSCOPY DIAGNOSTIC (N/A)    Final Anesthesia Type: general      Patient location during evaluation: PACU  Patient participation: Yes- Able to Participate  Level of consciousness: awake and alert  Post-procedure vital signs: reviewed and stable  Pain management: adequate  Airway patency: patent    PONV status at discharge: No PONV  Anesthetic complications: no      Cardiovascular status: hemodynamically stable  Respiratory status: unassisted and spontaneous ventilation  Hydration status: euvolemic  Follow-up not needed.      Pt did well during surgery and recovery. She denied any CP/SOB/N/V. Advised pt to follow up with her PCP for continued management of her HTN. Pt states her understanding.     Vitals Value Taken Time   /92 09/06/22 1324   Temp 36.6 °C (97.9 °F) 09/06/22 1210   Pulse 64 09/06/22 1326   Resp 13 09/06/22 1326   SpO2 94 % 09/06/22 1326   Vitals shown include unvalidated device data.      Event Time   Out of Recovery 13:15:00         Pain/Susan Score: Pain Rating Prior to Med Admin: 6 (9/6/2022  1:01 PM)  Susan Score: 10 (9/6/2022  1:15 PM)

## 2022-09-07 NOTE — TELEPHONE ENCOUNTER
Pt had Ablation today. C/o abdominal pain 7-8/10 that's not relieved by heating pad, Tylenol or Ibuprofen.Pt also c/o  and headache. Care advice to call on call provider. Spoke to Dr. Jaylene Mckay; not on call for provider. No provider on call per . Pt advised to go to ED. Verbalized understanding. Encounter routed to provider.         Reason for Disposition   [1] SEVERE post-op pain (e.g., excruciating, pain scale 8-10) AND [2] not controlled with pain medications    Additional Information   Negative: Sounds like a life-threatening emergency to the triager   Negative: [1] Widespread rash AND [2] bright red, sunburn-like   Negative: [1] SEVERE headache AND [2] after spinal (epidural) anesthesia   Negative: [1] Vomiting AND [2] persists > 4 hours   Negative: [1] Vomiting AND [2] abdomen looks much more swollen than usual   Negative: [1] Drinking very little AND [2] dehydration suspected (e.g., no urine > 12 hours, very dry mouth, very lightheaded)   Negative: Patient sounds very sick or weak to the triager   Negative: Sounds like a serious complication to the triager   Negative: Fever > 100.5 F (38.1 C)    Protocols used: Post-Op Symptoms and [id number]

## 2022-09-09 NOTE — DISCHARGE SUMMARY
Admit Diagnosis:  Symptomatic fibroids  DC Diagnosis: s/p Sonata, hysteroscopy procedure    Patient was admitted to the [facility] to undergo hysteroscopy and Sonata procedure.  Please see operative note for more detail.  She had an excellent postop course and had a normal return of bladder function and reported excellent analgesia.  She was discharged to home after meeting discharge criteria.

## 2023-09-25 ENCOUNTER — PATIENT MESSAGE (OUTPATIENT)
Dept: ADMINISTRATIVE | Facility: HOSPITAL | Age: 38
End: 2023-09-25
Payer: COMMERCIAL

## 2025-03-22 ENCOUNTER — HOSPITAL ENCOUNTER (EMERGENCY)
Facility: HOSPITAL | Age: 40
Discharge: HOME | End: 2025-03-22
Attending: EMERGENCY MEDICINE
Payer: COMMERCIAL

## 2025-03-22 VITALS
TEMPERATURE: 99 F | SYSTOLIC BLOOD PRESSURE: 170 MMHG | HEART RATE: 96 BPM | RESPIRATION RATE: 19 BRPM | BODY MASS INDEX: 35.78 KG/M2 | OXYGEN SATURATION: 100 % | DIASTOLIC BLOOD PRESSURE: 98 MMHG | WEIGHT: 215 LBS

## 2025-03-22 DIAGNOSIS — I10 ESSENTIAL HYPERTENSION: ICD-10-CM

## 2025-03-22 DIAGNOSIS — D64.9 CHRONIC ANEMIA: ICD-10-CM

## 2025-03-22 DIAGNOSIS — D21.9 FIBROIDS: ICD-10-CM

## 2025-03-22 DIAGNOSIS — R10.2 PELVIC PAIN: Primary | ICD-10-CM

## 2025-03-22 LAB
ABSOLUTE EOSINOPHIL (OHS): 0.07 K/UL
ABSOLUTE MONOCYTE (OHS): 0.57 K/UL (ref 0.3–1)
ABSOLUTE NEUTROPHIL COUNT (OHS): 7.41 K/UL (ref 1.8–7.7)
ALBUMIN SERPL BCP-MCNC: 3.8 G/DL (ref 3.5–5.2)
ALP SERPL-CCNC: 51 UNIT/L (ref 40–150)
ALT SERPL W/O P-5'-P-CCNC: 12 UNIT/L (ref 10–44)
ANION GAP (OHS): 11 MMOL/L (ref 8–16)
ANISOCYTOSIS BLD QL SMEAR: NORMAL
AST SERPL-CCNC: 16 UNIT/L (ref 11–45)
BASOPHILS # BLD AUTO: 0.05 K/UL
BASOPHILS NFR BLD AUTO: 0.5 %
BILIRUB SERPL-MCNC: 0.2 MG/DL (ref 0.1–1)
BUN SERPL-MCNC: 10 MG/DL (ref 6–20)
CALCIUM SERPL-MCNC: 8.8 MG/DL (ref 8.7–10.5)
CHLORIDE SERPL-SCNC: 108 MMOL/L (ref 95–110)
CO2 SERPL-SCNC: 19 MMOL/L (ref 23–29)
CREAT SERPL-MCNC: 0.8 MG/DL (ref 0.5–1.4)
ERYTHROCYTE [DISTWIDTH] IN BLOOD BY AUTOMATED COUNT: 28.3 % (ref 11.5–14.5)
GFR SERPLBLD CREATININE-BSD FMLA CKD-EPI: >60 ML/MIN/1.73/M2
GLUCOSE SERPL-MCNC: 111 MG/DL (ref 70–110)
HCT VFR BLD AUTO: 32.5 % (ref 37–48.5)
HGB BLD-MCNC: 9.6 GM/DL (ref 12–16)
IMM GRANULOCYTES # BLD AUTO: 0.04 K/UL (ref 0–0.04)
IMM GRANULOCYTES NFR BLD AUTO: 0.4 % (ref 0–0.5)
LARGE/GIANT PLATELETS (OHS): PRESENT
LIPASE SERPL-CCNC: 23 U/L (ref 4–60)
LYMPHOCYTES # BLD AUTO: 1.59 K/UL (ref 1–4.8)
MCH RBC QN AUTO: 21.4 PG (ref 27–50)
MCHC RBC AUTO-ENTMCNC: 29.5 G/DL (ref 32–36)
MCV RBC AUTO: 73 FL (ref 82–98)
NUCLEATED RBC (/100WBC) (OHS): 0 /100 WBC
OVALOCYTES BLD QL SMEAR: NORMAL
PLATELET # BLD AUTO: 365 K/UL (ref 150–450)
PLATELET BLD QL SMEAR: NORMAL
PMV BLD AUTO: 9.8 FL (ref 9.2–12.9)
POIKILOCYTOSIS BLD QL SMEAR: SLIGHT
POTASSIUM SERPL-SCNC: 3.8 MMOL/L (ref 3.5–5.1)
PROT SERPL-MCNC: 7.7 GM/DL (ref 6–8.4)
RBC # BLD AUTO: 4.48 M/UL (ref 4–5.4)
RELATIVE EOSINOPHIL (OHS): 0.7 %
RELATIVE LYMPHOCYTE (OHS): 16.3 % (ref 18–48)
RELATIVE MONOCYTE (OHS): 5.9 % (ref 4–15)
RELATIVE NEUTROPHIL (OHS): 76.2 % (ref 38–73)
SODIUM SERPL-SCNC: 138 MMOL/L (ref 136–145)
WBC # BLD AUTO: 9.73 K/UL (ref 3.9–12.7)

## 2025-03-22 PROCEDURE — 96374 THER/PROPH/DIAG INJ IV PUSH: CPT

## 2025-03-22 PROCEDURE — 99284 EMERGENCY DEPT VISIT MOD MDM: CPT | Mod: 25

## 2025-03-22 PROCEDURE — 63600175 PHARM REV CODE 636 W HCPCS: Performed by: EMERGENCY MEDICINE

## 2025-03-22 PROCEDURE — 80053 COMPREHEN METABOLIC PANEL: CPT | Performed by: NURSE PRACTITIONER

## 2025-03-22 PROCEDURE — 83690 ASSAY OF LIPASE: CPT | Performed by: NURSE PRACTITIONER

## 2025-03-22 PROCEDURE — 85025 COMPLETE CBC W/AUTO DIFF WBC: CPT | Performed by: NURSE PRACTITIONER

## 2025-03-22 PROCEDURE — 96375 TX/PRO/DX INJ NEW DRUG ADDON: CPT

## 2025-03-22 RX ORDER — OXYCODONE AND ACETAMINOPHEN 5; 325 MG/1; MG/1
1 TABLET ORAL EVERY 6 HOURS PRN
Qty: 10 TABLET | Refills: 0 | Status: SHIPPED | OUTPATIENT
Start: 2025-03-22

## 2025-03-22 RX ORDER — MORPHINE SULFATE 4 MG/ML
4 INJECTION, SOLUTION INTRAMUSCULAR; INTRAVENOUS
Refills: 0 | Status: COMPLETED | OUTPATIENT
Start: 2025-03-22 | End: 2025-03-22

## 2025-03-22 RX ORDER — ERGOCALCIFEROL 1.25 MG/1
1 CAPSULE ORAL
COMMUNITY
Start: 2025-02-21

## 2025-03-22 RX ORDER — ACETAMINOPHEN 500 MG
1 TABLET ORAL EVERY 6 HOURS PRN
COMMUNITY

## 2025-03-22 RX ORDER — ONDANSETRON HYDROCHLORIDE 2 MG/ML
4 INJECTION, SOLUTION INTRAVENOUS
Status: COMPLETED | OUTPATIENT
Start: 2025-03-22 | End: 2025-03-22

## 2025-03-22 RX ORDER — HYDRALAZINE HYDROCHLORIDE 20 MG/ML
10 INJECTION INTRAMUSCULAR; INTRAVENOUS
Status: COMPLETED | OUTPATIENT
Start: 2025-03-22 | End: 2025-03-22

## 2025-03-22 RX ORDER — OLMESARTAN MEDOXOMIL 20 MG/1
1 TABLET ORAL EVERY MORNING
COMMUNITY

## 2025-03-22 RX ADMIN — ONDANSETRON 4 MG: 2 INJECTION INTRAMUSCULAR; INTRAVENOUS at 08:03

## 2025-03-22 RX ADMIN — MORPHINE SULFATE 4 MG: 4 INJECTION INTRAVENOUS at 08:03

## 2025-03-22 RX ADMIN — HYDRALAZINE HYDROCHLORIDE 10 MG: 20 INJECTION INTRAMUSCULAR; INTRAVENOUS at 08:03

## 2025-03-22 NOTE — FIRST PROVIDER EVALUATION
Medical screening examination initiated.  I have conducted a focused provider triage encounter, findings are as follows:    Brief history of present illness:  39-year-old female who presents to ER complaining of lower abdominal pain with history of fibroids    Vitals:    03/22/25 1649   BP: (!) 199/95  Comment: took HTN meds this morning.   BP Location: Right arm   Pulse: 90   Resp: 20   Temp: 98.6 °F (37 °C)   TempSrc: Oral   SpO2: 98%   Weight: 97.5 kg (215 lb)  Comment: unable to stand       Pertinent physical exam:  Hypertensive in triage    Brief workup plan:  Labs, imaging, further evaluation    Preliminary workup initiated; this workup will be continued and followed by the physician or advanced practice provider that is assigned to the patient when roomed.

## 2025-03-23 NOTE — ED PROVIDER NOTES
Encounter Date: 3/22/2025       History     Chief Complaint   Patient presents with    Abdominal Pain     Low abd pain and low back pain, hx of fibroids, currently on menstrual cycle. GYN at [facility]. Reports she needs a hysterectomy.      Patient is a 39-year-old female who presents to the emergency department with complaints of bilateral lower abdominal pain.  Patient reports that this has been a chronic issue for her.  She had a procedure done for fibroids 3 years ago.  This did help her with her pain, but she has noticed that her pain has started to return over the past several months.  Today she stated that she felt a sharp pain that was different than her prior episodes.  It was unrelieved with over-the-counter medications so she presented to the emergency department on my exam however she states that her pain is resolved at this time.  She denies any diarrhea, vomiting, fever, dysuria, flank pain, and all other complaints.  She is currently on her menstrual cycle.  She also notes a history of menorrhagia with iron-deficiency anemia.  She has required blood transfusions in the past      Review of patient's allergies indicates:  No Known Allergies  Past Medical History:   Diagnosis Date    Disorder of sweat glands     Encounter for blood transfusion     Fibroids     Hypertension      Past Surgical History:   Procedure Laterality Date    HYSTEROSCOPY N/A 09/06/2022    Procedure: HYSTEROSCOPY DIAGNOSTIC;  Surgeon: Bradley Huertas MD;  Location: [facility];  Service: OB/GYN;  Laterality: N/A;    SONATA  09/06/2022    Sweat gland removal surgery Bilateral     TUBAL LIGATION       Family History   Problem Relation Name Age of Onset    Hypertension Mother      Diabetes Mother      Heart disease Father      No Known Problems Sister      No Known Problems Brother      Diabetes Maternal Grandfather      Heart disease Maternal Grandfather      Hypertension Maternal Grandmother      Lung cancer Paternal Grandfather       Diabetes Paternal Grandmother       Social History[1]  Review of Systems   Gastrointestinal:  Positive for abdominal pain. Anal bleeding: bilateral lower.  All other systems reviewed and are negative.      Physical Exam     Initial Vitals [03/22/25 1649]   BP Pulse Resp Temp SpO2   (!) 199/95 90 20 98.6 °F (37 °C) 98 %      MAP       --         Physical Exam    Nursing note and vitals reviewed.  Constitutional: She appears well-developed and well-nourished. No distress.   HENT:   Head: Normocephalic and atraumatic. Mouth/Throat: Oropharynx is clear and moist.   Eyes: Conjunctivae and EOM are normal. Pupils are equal, round, and reactive to light.   Neck: Neck supple. No tracheal deviation present.   Cardiovascular:  Normal rate, regular rhythm, normal heart sounds and intact distal pulses.           Pulmonary/Chest: Breath sounds normal. No respiratory distress.   Abdominal: Abdomen is soft. She exhibits no distension. There is no abdominal tenderness. There is no rebound and no guarding.   Musculoskeletal:         General: No tenderness or edema. Normal range of motion.      Cervical back: Neck supple.     Neurological: She is alert and oriented to person, place, and time. GCS score is 15. GCS eye subscore is 4. GCS verbal subscore is 5. GCS motor subscore is 6.   No focal deficits   Skin: Skin is warm. No rash noted. No erythema.   Psychiatric: She has a normal mood and affect. Her behavior is normal.         ED Course   Procedures  Labs Reviewed   COMPREHENSIVE METABOLIC PANEL - Abnormal       Result Value    Sodium 138      Potassium 3.8      Chloride 108      CO2 19 (*)     Glucose 111 (*)     BUN 10      Creatinine 0.8      Calcium 8.8      Protein Total 7.7      Albumin 3.8      Bilirubin Total 0.2      ALP 51      AST 16      ALT 12      Anion Gap 11      eGFR >60     CBC WITH DIFFERENTIAL - Abnormal    WBC 9.73      RBC 4.48      HGB 9.6 (*)     HCT 32.5 (*)     MCV 73 (*)     MCH 21.4 (*)     MCHC 29.5 (*)      RDW 28.3 (*)     Platelet Count 365      MPV 9.8      Nucleated RBC 0      Neut % 76.2 (*)     Lymph % 16.3 (*)     Mono % 5.9      Eos % 0.7      Basophil % 0.5      Imm Grans % 0.4      Neut # 7.41      Lymph # 1.59      Mono # 0.57      Eos # 0.07      Baso # 0.05      Imm Grans # 0.04     LIPASE - Normal    Lipase Level 23     CBC W/ AUTO DIFFERENTIAL    Narrative:     The following orders were created for panel order CBC W/ AUTO DIFFERENTIAL.  Procedure                               Abnormality         Status                     ---------                               -----------         ------                     CBC with Differential[[id number]]        Abnormal            Final result                 Please view results for these tests on the individual orders.   MORPHOLOGY    Platelet Estimate Appears Normal      Anisocytosis Moderate      Poik Slight      Ovalocytes Occasional      Large/Giant Platelets Present     URINALYSIS, REFLEX TO URINE CULTURE   PREGNANCY TEST, URINE RAPID          Imaging Results    None          Medications   morphine injection 4 mg (4 mg Intravenous Given 3/22/25 2013)   ondansetron injection 4 mg (4 mg Intravenous Given 3/22/25 2013)   hydrALAZINE injection 10 mg (10 mg Intravenous Given 3/22/25 2012)     Medical Decision Making  Patient states that her pain is resolved on my exam.  She was offered the urinalysis and pelvic ultrasound for complete workup, but patient politely declining.  She states that she has an outpatient transvaginal ultrasound already ordered and she will follow-up for the ultrasound outpatient.  Her OBGYN is with [facility].  Denies any symptoms of urinary tract infection such as dysuria, urgency, frequency, flank pain, fever/chills.  Advised continuing over-the-counter medications.  Short term prescription of oxycodone provided should she needs some breakthrough pain relief if the pain recurs.  ER return precautions provided.    I discussed her  anemia and hemoglobin of 9.  Patient was pleasantly surprised and states that is higher than her normal.    Patient does have a known history of high blood pressure.  She is compliant with her olmesartan.  She states her normal blood pressure is around 140 or 150 systolic.  Treated in the ED with hydralazine.  Advised keeping a blood pressure log at home and following up with primary care physician    Amount and/or Complexity of Data Reviewed  External Data Reviewed: notes.     Details: Past medical history, medications, allergies reviewed  Labs: ordered. Decision-making details documented in ED Course.    Risk  OTC drugs.  Prescription drug management.  Parenteral controlled substances.                     Vitals:    03/22/25 1933 03/22/25 1939 03/22/25 2012 03/22/25 2033   BP: (!) 195/111  (!) 225/100 (!) 197/91   BP Location:       Pulse: 80 90 82 86   Resp: 19  16    Temp:       TempSrc:       SpO2: 100%  100% 100%   Weight:                          Clinical Impression:  Final diagnoses:  [R10.2] Pelvic pain (Primary)  [D21.9] Fibroids  [I10] Essential hypertension  [D64.9] Chronic anemia          ED Disposition Condition    Discharge Stable          ED Prescriptions       Medication Sig Dispense Start Date End Date Auth. Provider    oxyCODONE-acetaminophen (PERCOCET) 5-325 mg per tablet Take 1 tablet by mouth every 6 (six) hours as needed for Pain. 10 tablet 3/22/2025 -- Lucas Degroot MD          Follow-up Information       Follow up With Specialties Details Why Contact Info    Follow up with your OBGYN        [facility] - Emergency Dept. Emergency Medicine  As needed, If symptoms worsen [address] [facility] [address] [phone]                 [1]   Social History  Tobacco Use    Smoking status: Never    Smokeless tobacco: Never   Substance Use Topics    Alcohol use: Not Currently    Drug use: Never        Lucas Degroot MD  03/22/25 5079

## (undated) DEVICE — GLOVE SURGICAL LATEX SZ 8

## (undated) DEVICE — SOL WATER STRL IRR 1000ML

## (undated) DEVICE — HEADREST ROUND DISP FOAM 9IN

## (undated) DEVICE — SUT CHROMIC 3-0 SH 27IN GUT

## (undated) DEVICE — UNDERGLOVES BIOGEL PI SIZE 8

## (undated) DEVICE — DRAPE UINDERBUT GRAD PCH

## (undated) DEVICE — STRAP ARMBOARD DISP 1.5X32IN

## (undated) DEVICE — PACK DRAPE PERI/GYN TIBURON

## (undated) DEVICE — COVER PROXIMA MAYO STAND

## (undated) DEVICE — COVER LIGHT HANDLE 80/CA

## (undated) DEVICE — Device

## (undated) DEVICE — MANIFOLD 4 PORT

## (undated) DEVICE — GOWN SMARTGOWN LVL4 X-LONG XL

## (undated) DEVICE — CATH ALL PURPOSE URETHRAL 14FR

## (undated) DEVICE — PACK BASIC SETUP SC BR

## (undated) DEVICE — SYR B-D DISP CONTROL 10CC100/C

## (undated) DEVICE — TRAY SKIN SCRUB WET PREMIUM

## (undated) DEVICE — COVER CAMERA OPERATING ROOM

## (undated) DEVICE — SUPPORT ULNA NERVE PROTECTOR

## (undated) DEVICE — GOWN SMARTGOWN RAGLAN BLUE XL

## (undated) DEVICE — PAD ABD 8X10 STERILE